# Patient Record
Sex: MALE | Race: WHITE | NOT HISPANIC OR LATINO | URBAN - METROPOLITAN AREA
[De-identification: names, ages, dates, MRNs, and addresses within clinical notes are randomized per-mention and may not be internally consistent; named-entity substitution may affect disease eponyms.]

---

## 2018-06-11 ENCOUNTER — OFFICE VISIT (OUTPATIENT)
Dept: GASTROENTEROLOGY | Facility: CLINIC | Age: 33
End: 2018-06-11
Payer: COMMERCIAL

## 2018-06-11 ENCOUNTER — LAB VISIT (OUTPATIENT)
Dept: LAB | Facility: HOSPITAL | Age: 33
End: 2018-06-11
Attending: NURSE PRACTITIONER
Payer: COMMERCIAL

## 2018-06-11 VITALS
DIASTOLIC BLOOD PRESSURE: 71 MMHG | HEART RATE: 64 BPM | WEIGHT: 254.13 LBS | BODY MASS INDEX: 38.51 KG/M2 | SYSTOLIC BLOOD PRESSURE: 119 MMHG | HEIGHT: 68 IN

## 2018-06-11 DIAGNOSIS — R10.12 LEFT UPPER QUADRANT PAIN: ICD-10-CM

## 2018-06-11 DIAGNOSIS — Z79.1 LONG TERM (CURRENT) USE OF NON-STEROIDAL ANTI-INFLAMMATORIES (NSAID): ICD-10-CM

## 2018-06-11 DIAGNOSIS — R12 HEARTBURN: ICD-10-CM

## 2018-06-11 DIAGNOSIS — R10.12 LEFT UPPER QUADRANT PAIN: Primary | ICD-10-CM

## 2018-06-11 LAB
ALBUMIN SERPL BCP-MCNC: 4.1 G/DL
ALP SERPL-CCNC: 61 U/L
ALT SERPL W/O P-5'-P-CCNC: 77 U/L
AMYLASE SERPL-CCNC: 71 U/L
ANION GAP SERPL CALC-SCNC: 7 MMOL/L
AST SERPL-CCNC: 37 U/L
BASOPHILS # BLD AUTO: 0.04 K/UL
BASOPHILS NFR BLD: 0.6 %
BILIRUB SERPL-MCNC: 0.5 MG/DL
BUN SERPL-MCNC: 17 MG/DL
CALCIUM SERPL-MCNC: 9.3 MG/DL
CHLORIDE SERPL-SCNC: 109 MMOL/L
CO2 SERPL-SCNC: 28 MMOL/L
CREAT SERPL-MCNC: 1.5 MG/DL
DIFFERENTIAL METHOD: ABNORMAL
EOSINOPHIL # BLD AUTO: 0 K/UL
EOSINOPHIL NFR BLD: 0.3 %
ERYTHROCYTE [DISTWIDTH] IN BLOOD BY AUTOMATED COUNT: 11.7 %
EST. GFR  (AFRICAN AMERICAN): >60 ML/MIN/1.73 M^2
EST. GFR  (NON AFRICAN AMERICAN): 59.9 ML/MIN/1.73 M^2
GLUCOSE SERPL-MCNC: 95 MG/DL
HCT VFR BLD AUTO: 42 %
HGB BLD-MCNC: 14.5 G/DL
IMM GRANULOCYTES # BLD AUTO: 0.01 K/UL
IMM GRANULOCYTES NFR BLD AUTO: 0.2 %
LIPASE SERPL-CCNC: 25 U/L
LYMPHOCYTES # BLD AUTO: 2 K/UL
LYMPHOCYTES NFR BLD: 32 %
MCH RBC QN AUTO: 33.2 PG
MCHC RBC AUTO-ENTMCNC: 34.5 G/DL
MCV RBC AUTO: 96 FL
MONOCYTES # BLD AUTO: 0.5 K/UL
MONOCYTES NFR BLD: 8.2 %
NEUTROPHILS # BLD AUTO: 3.7 K/UL
NEUTROPHILS NFR BLD: 58.7 %
NRBC BLD-RTO: 0 /100 WBC
PLATELET # BLD AUTO: 202 K/UL
PMV BLD AUTO: 11.3 FL
POTASSIUM SERPL-SCNC: 4.5 MMOL/L
PROT SERPL-MCNC: 6.8 G/DL
RBC # BLD AUTO: 4.37 M/UL
SODIUM SERPL-SCNC: 144 MMOL/L
WBC # BLD AUTO: 6.34 K/UL

## 2018-06-11 PROCEDURE — 36415 COLL VENOUS BLD VENIPUNCTURE: CPT | Mod: PO

## 2018-06-11 PROCEDURE — 83690 ASSAY OF LIPASE: CPT

## 2018-06-11 PROCEDURE — 3008F BODY MASS INDEX DOCD: CPT | Mod: CPTII,S$GLB,, | Performed by: NURSE PRACTITIONER

## 2018-06-11 PROCEDURE — 99203 OFFICE O/P NEW LOW 30 MIN: CPT | Mod: S$GLB,,, | Performed by: NURSE PRACTITIONER

## 2018-06-11 PROCEDURE — 82150 ASSAY OF AMYLASE: CPT

## 2018-06-11 PROCEDURE — 80053 COMPREHEN METABOLIC PANEL: CPT

## 2018-06-11 PROCEDURE — 99999 PR PBB SHADOW E&M-NEW PATIENT-LVL III: CPT | Mod: PBBFAC,,, | Performed by: NURSE PRACTITIONER

## 2018-06-11 PROCEDURE — 85025 COMPLETE CBC W/AUTO DIFF WBC: CPT

## 2018-06-11 RX ORDER — OMEPRAZOLE 40 MG/1
40 CAPSULE, DELAYED RELEASE ORAL
Qty: 30 CAPSULE | Refills: 3 | Status: ON HOLD | OUTPATIENT
Start: 2018-06-11 | End: 2018-06-14

## 2018-06-11 RX ORDER — NAPROXEN SODIUM 220 MG
220 TABLET ORAL 2 TIMES DAILY PRN
COMMUNITY

## 2018-06-11 NOTE — PATIENT INSTRUCTIONS
Abdominal Pain    Abdominal pain is pain in the stomach or belly area. Everyone has this pain from time to time. In many cases it goes away on its own. But abdominal pain can sometimes be due to a serious problem, such as appendicitis. So its important to know when to seek help.  Causes of abdominal pain  There are many possible causes of abdominal pain. Common causes in adults include:  · Constipation, diarrhea, or gas  · Stomach acid flowing back up into the esophagus (acid reflux or heartburn)  · Severe acid reflux, called GERD (gastroesophageal reflux disease)  · A sore in the lining of the stomach or small intestine (peptic ulcer)  · Inflammation of the gallbladder, liver, or pancreas  · Gallstones or kidney stones  · Appendicitis   · Intestinal blockage   · An internal organ pushing through a muscle or other tissue (hernia)  · Urinary tract infections  · In women, menstrual cramps, fibroids, or endometriosis  · Inflammation or infection of the intestines  Diagnosing the cause of abdominal pain  Your healthcare provider will do a physical exam help find the cause of your pain. If needed, tests will be ordered. Belly pain has many possible causes. So it can be hard to find the reason for your pain. Giving details about your pain can help. Tell your provider where and when you feel the pain, and what makes it better or worse. Also let your provider know if you have other symptoms such as:  · Fever  · Tiredness  · Upset stomach (nausea)  · Vomiting  · Changes in bathroom habits  Treating abdominal pain  Some causes of pain need emergency medical treatment right away. These include appendicitis or a bowel blockage. Other problems can be treated with rest, fluids, or medicines. Your healthcare provider can give you specific instructions for treatment or self-care based on what is causing your pain.  If you have vomiting or diarrhea, sip water or other clear fluids. When you are ready to eat solid foods again,  start with small amounts of easy-to-digest, low-fat foods. These include apple sauce, toast, or crackers.   When to seek medical care  Call 911 or go to the hospital right away if you:  · Cant pass stool and are vomiting  · Are vomiting blood or have bloody diarrhea or black, tarry diarrhea  · Have chest, neck, or shoulder pain  · Feel like you might pass out  · Have pain in your shoulder blades with nausea  · Have sudden, severe belly pain  · Have new, severe pain unlike any you have felt before  · Have a belly that is rigid, hard, and tender to touch  Call your healthcare provider if you have:  · Pain for more than 5 days  · Bloating for more than 2 days  · Diarrhea for more than 5 days  · A fever of 100.4°F (38.0°C) or higher, or as directed by your provider  · Pain that gets worse  · Weight loss for no reason  · Continued lack of appetite  · Blood in your stool  How to prevent abdominal pain  Here are some tips to help prevent abdominal pain:  · Eat smaller amounts of food at one time.  · Avoid greasy, fried, or other high-fat foods.  · Avoid foods that give you gas.  · Exercise regularly.  · Drink plenty of fluids.  To help prevent GERD symptoms:  · Quit smoking.  · Reduce alcohol and certain foods that increase stomach acid.  · Avoid aspirin and over-the-counter pain and fever medicines (NSAIDS or nonsteroidal anti-inflammatory drugs), if possible  · Lose extra weight.  · Finish eating at least 2 hours before you go to bed or lie down.  · Raise the head of your bed.  Date Last Reviewed: 7/1/2016  © 2293-2074 [employer]. [address] [place] [address], [place] [address]. All rights reserved. This information is not intended as a substitute for professional medical care. Always follow your healthcare professional's instructions.

## 2018-06-11 NOTE — PROGRESS NOTES
Subjective:       Patient ID: Jack Sanders is a 32 y.o. male Body mass index is 38.64 kg/m².    Chief Complaint: Abdominal Pain (left abdomen)    This patient is new to me.    Abdominal Pain   This is a chronic problem. The current episode started more than 1 year ago (started 4-5 years ago). The problem occurs intermittently (once every 2-3 weeks). The most recent episode lasted 2 days. The pain is located in the LUQ. The pain is at a severity of 0/10 (currently). The quality of the pain is burning. The abdominal pain radiates to the LLQ (rarely). Associated symptoms include belching and flatus. Pertinent negatives include no anorexia, constipation, diarrhea, dysuria, fever, frequency, hematochezia, melena, nausea, vomiting or weight loss. The pain is aggravated by NSAIDs (over eating, spicy foods, caffeine, hamburgers, aleve prn takes frequently). The pain is relieved by bowel movements, belching and passing flatus. He has tried nothing for the symptoms. His past medical history is significant for GERD (heartburn 1-2 times a week). There is no history of Crohn's disease, gallstones, pancreatitis or ulcerative colitis.     Review of Systems   Constitutional: Negative for appetite change, chills, fatigue, fever, unexpected weight change and weight loss.   HENT: Negative for sore throat and trouble swallowing.    Respiratory: Negative for cough, choking and shortness of breath.    Cardiovascular: Negative for chest pain.   Gastrointestinal: Positive for abdominal pain and flatus. Negative for anal bleeding, anorexia, blood in stool, constipation, diarrhea, hematochezia, melena, nausea, rectal pain and vomiting.   Genitourinary: Negative for difficulty urinating, dysuria, flank pain and frequency.   Neurological: Negative for weakness.       Past Medical History:   Diagnosis Date    Gout      History reviewed. No pertinent surgical history.  Family History   Problem Relation Age of Onset    Colon cancer Neg Hx      Colon polyps Neg Hx     Crohn's disease Neg Hx     Esophageal cancer Neg Hx     Rectal cancer Neg Hx     Ulcerative colitis Neg Hx     Stomach cancer Neg Hx      Wt Readings from Last 10 Encounters:   06/11/18 115.3 kg (254 lb 1.6 oz)     Objective:      Physical Exam   Constitutional: He is oriented to person, place, and time. He appears well-developed and well-nourished. No distress.   HENT:   Mouth/Throat: Oropharynx is clear and moist and mucous membranes are normal. No oral lesions. No oropharyngeal exudate.   Eyes: Conjunctivae are normal. Pupils are equal, round, and reactive to light. No scleral icterus.   Cardiovascular: Normal rate.    Pulmonary/Chest: Effort normal and breath sounds normal. No respiratory distress. He has no wheezes.   Abdominal: Soft. Normal appearance and bowel sounds are normal. He exhibits no distension, no abdominal bruit and no mass. There is no hepatosplenomegaly. There is tenderness (mild) in the left upper quadrant and left lower quadrant. There is no rigidity, no rebound, no guarding, no tenderness at McBurney's point and negative Tian's sign. No hernia.   Neurological: He is alert and oriented to person, place, and time.   Skin: Skin is warm and dry. No rash noted. He is not diaphoretic. No erythema. No pallor.   Non-jaundiced   Psychiatric: He has a normal mood and affect. His behavior is normal. Judgment and thought content normal.   Nursing note and vitals reviewed.      Assessment:       1. Left upper quadrant pain    2. Heartburn    3. Long term (current) use of non-steroidal anti-inflammatories (nsaid)        Plan:       Left upper quadrant pain  -     CBC auto differential; Future; Expected date: 06/11/2018  -     Comprehensive metabolic panel; Future; Expected date: 06/11/2018  -     Lipase; Future; Expected date: 06/11/2018  -     Amylase; Future; Expected date: 06/11/2018  -     US Abdomen Complete; Future; Expected date: 06/11/2018  -     START omeprazole  (PRILOSEC) 40 MG capsule; Take 1 capsule (40 mg total) by mouth before breakfast.  Dispense: 30 capsule; Refill: 3  - schedule EGD, discussed procedure with patient, patient verbalized understanding  - Possible CT SCAN pending results of testing and if symptoms persist    Heartburn  -     START omeprazole (PRILOSEC) 40 MG capsule; Take 1 capsule (40 mg total) by mouth before breakfast.  Dispense: 30 capsule; Refill: 3, - take in the morning 30-60 minutes before breakfast, discussed about possible long term use of medication (prefer to use lowest effective dose or discontinuing if possible), pt verbalized understanding  -discussed about the different types of medications used to treat reflux and how to use them, antacids can be used PRN for breakthrough heartburn symptoms by reducing stomach acid that is already produced, H2 blockers (zantac) work by limiting the amount acid production, & PPI's work to block acid production and are taken daily, patient verbalized understanding.  -Educated patient on lifestyle modifications to help control/reduce reflux/abdominal pain including: avoid large meals, avoid eating within 2-3 hours of bedtime (avoid late night eating & lying down soon after eating), elevate head of bed if nocturnal symptoms are present, smoking cessation (if current smoker), & weight loss (if overweight).   -Educated to avoid known foods which trigger reflux symptoms & to minimize/avoid high-fat foods, chocolate, caffeine, citrus, alcohol, & tomato products.  -Advised to avoid/limit use of NSAID's, since they can cause GI upset, bleeding, and/or ulcers. If needed, take with food.   - schedule EGD, discussed procedure with patient, patient verbalized understanding    Long term (current) use of non-steroidal anti-inflammatories (nsaid)  - avoid/minimize use of NSAIDs- since they can cause GI upset, bleeding and/or ulcers. If NSAID must be taken, recommend take with food.    Follow-up in about 1 month (around  7/11/2018), or if symptoms worsen or fail to improve.      If no improvement in symptoms or symptoms worsen, call/follow-up at clinic or go to ER.

## 2018-06-13 NOTE — H&P
History & Physical - Short Stay  Gastroenterology      SUBJECTIVE:     Procedure: Gastroscopy    Chief Complaint/Indication for Procedure: Abdo pain, LUQ.    History of Present Illness:  Office Visit  Open      6/11/2018  [facility] - Gastroenterology   RICHIE Driver   Gastroenterology   Left upper quadrant pain +2 more   Dx   Abdominal Pain ; Referred by Aaareferral Self   Reason for Visit    Progress Notes   Unsigned      Subjective:       Patient ID: Jack Sanders is a 32 y.o. male Body mass index is 38.64 kg/m².     Chief Complaint: Abdominal Pain (left abdomen)     This patient is new to me.     Abdominal Pain   This is a chronic problem. The current episode started more than 1 year ago (started 4-5 years ago). The problem occurs intermittently (once every 2-3 weeks). The most recent episode lasted 2 days. The pain is located in the LUQ. The pain is at a severity of 0/10 (currently). The quality of the pain is burning. The abdominal pain radiates to the LLQ (rarely). Associated symptoms include belching and flatus. Pertinent negatives include no anorexia, constipation, diarrhea, dysuria, fever, frequency, hematochezia, melena, nausea, vomiting or weight loss. The pain is aggravated by NSAIDs (over eating, spicy foods, caffeine, hamburgers, aleve prn takes frequently). The pain is relieved by bowel movements, belching and passing flatus. He has tried nothing for the symptoms. His past medical history is significant for GERD (heartburn 1-2 times a week). There is no history of Crohn's disease, gallstones, pancreatitis or ulcerative colitis.     Assessment:       1. Abdominal pain, unspecified abdominal location        Plan:       Abdominal pain, unspecified abdominal location        Follow-up in about 1 month (around 7/11/2018), or if symptoms worsen or fail to improve.      If no improvement in symptoms or symptoms worsen, call/follow-up at clinic or go to ER.              PTA Medications   Medication  "Sig    naproxen sodium (ANAPROX) 220 MG tablet Take 220 mg by mouth 2 (two) times daily as needed.    omeprazole (PRILOSEC) 40 MG capsule Take 1 capsule (40 mg total) by mouth before breakfast.       Review of patient's allergies indicates:  No Known Allergies     Past Medical History:   Diagnosis Date    Gout      History reviewed. No pertinent surgical history.  Family History   Problem Relation Age of Onset    Colon cancer Neg Hx     Colon polyps Neg Hx     Crohn's disease Neg Hx     Esophageal cancer Neg Hx     Rectal cancer Neg Hx     Ulcerative colitis Neg Hx     Stomach cancer Neg Hx      Social History   Substance Use Topics    Smoking status: Never Smoker    Smokeless tobacco: Never Used    Alcohol use No         OBJECTIVE:     Vital Signs (Most Recent)  Temp: 97.9 °F (36.6 °C) (06/14/18 1139)  Pulse: (!) 55 (06/14/18 1139)  Resp: 16 (06/14/18 1139)  BP: 107/66 (06/14/18 1139)  SpO2: 97 % (06/14/18 1139)    Physical Exam:          : Ht 5' 8" (1.727 m)   Wt 115.3 kg (254 lb 1.6 oz)   BMI 38.64 kg/m²                                              GENERAL:  Comfortable, in no acute distress.                                 HEENT EXAM:  Nonicteric.  No adenopathy.  Oropharynx is clear.               NECK:  Supple.                                                               LUNGS:  Clear.                                                               CARDIAC:  Regular rate and rhythm.  S1, S2.  No murmur.                      ABDOMEN:  Soft, positive bowel sounds, nontender.  No hepatosplenomegaly or masses.  No rebound or guarding.                                             EXTREMITIES:  No edema.     MENTAL STATUS:  Alert and oriented.    ASSESSMENT/PLAN:     Assessment: Abdo pain, LUQ.    Plan: Gastroscopy    Anesthesia Plan:   MAC / General Anaesthesia    ASA Grade: ASA 2 - Patient with mild systemic disease with no functional limitations    MALLAMPATI SCORE: II (hard and soft palate, upper " portion of tonsils anduvula visible)

## 2018-06-14 ENCOUNTER — ANESTHESIA EVENT (OUTPATIENT)
Dept: ENDOSCOPY | Facility: HOSPITAL | Age: 33
End: 2018-06-14
Payer: COMMERCIAL

## 2018-06-14 ENCOUNTER — SURGERY (OUTPATIENT)
Age: 33
End: 2018-06-14

## 2018-06-14 ENCOUNTER — PATIENT MESSAGE (OUTPATIENT)
Dept: GASTROENTEROLOGY | Facility: CLINIC | Age: 33
End: 2018-06-14

## 2018-06-14 ENCOUNTER — TELEPHONE (OUTPATIENT)
Dept: GASTROENTEROLOGY | Facility: CLINIC | Age: 33
End: 2018-06-14

## 2018-06-14 ENCOUNTER — ANESTHESIA (OUTPATIENT)
Dept: ENDOSCOPY | Facility: HOSPITAL | Age: 33
End: 2018-06-14
Payer: COMMERCIAL

## 2018-06-14 ENCOUNTER — HOSPITAL ENCOUNTER (OUTPATIENT)
Facility: HOSPITAL | Age: 33
Discharge: HOME | End: 2018-06-14
Attending: INTERNAL MEDICINE | Admitting: INTERNAL MEDICINE
Payer: COMMERCIAL

## 2018-06-14 ENCOUNTER — DOCUMENTATION ONLY (OUTPATIENT)
Dept: TRANSPLANT | Facility: CLINIC | Age: 33
End: 2018-06-14

## 2018-06-14 ENCOUNTER — HOSPITAL ENCOUNTER (OUTPATIENT)
Dept: RADIOLOGY | Facility: HOSPITAL | Age: 33
Discharge: HOME | End: 2018-06-14
Attending: NURSE PRACTITIONER | Admitting: INTERNAL MEDICINE
Payer: COMMERCIAL

## 2018-06-14 VITALS
HEART RATE: 76 BPM | RESPIRATION RATE: 16 BRPM | SYSTOLIC BLOOD PRESSURE: 120 MMHG | DIASTOLIC BLOOD PRESSURE: 60 MMHG | TEMPERATURE: 98 F | HEIGHT: 68 IN | OXYGEN SATURATION: 98 % | WEIGHT: 255 LBS | BODY MASS INDEX: 38.65 KG/M2

## 2018-06-14 DIAGNOSIS — K76.0 FATTY LIVER: ICD-10-CM

## 2018-06-14 DIAGNOSIS — R74.01 ELEVATED ALT MEASUREMENT: Primary | ICD-10-CM

## 2018-06-14 DIAGNOSIS — R12 HEARTBURN: ICD-10-CM

## 2018-06-14 DIAGNOSIS — R10.9 ABDOMINAL PAIN: ICD-10-CM

## 2018-06-14 DIAGNOSIS — R10.12 LEFT UPPER QUADRANT PAIN: ICD-10-CM

## 2018-06-14 DIAGNOSIS — R16.2 HEPATOSPLENOMEGALY: ICD-10-CM

## 2018-06-14 LAB — H PYLORI INDEX VALUE: NEGATIVE

## 2018-06-14 PROCEDURE — D9220A PRA ANESTHESIA: Mod: CRNA,,, | Performed by: NURSE ANESTHETIST, CERTIFIED REGISTERED

## 2018-06-14 PROCEDURE — 88342 IMHCHEM/IMCYTCHM 1ST ANTB: CPT | Mod: 26,,, | Performed by: PATHOLOGY

## 2018-06-14 PROCEDURE — 88305 TISSUE EXAM BY PATHOLOGIST: CPT | Performed by: PATHOLOGY

## 2018-06-14 PROCEDURE — 25000003 PHARM REV CODE 250: Mod: PO | Performed by: INTERNAL MEDICINE

## 2018-06-14 PROCEDURE — 25000003 PHARM REV CODE 250: Mod: PO | Performed by: NURSE ANESTHETIST, CERTIFIED REGISTERED

## 2018-06-14 PROCEDURE — 87449 NOS EACH ORGANISM AG IA: CPT | Mod: PO | Performed by: INTERNAL MEDICINE

## 2018-06-14 PROCEDURE — 37000009 HC ANESTHESIA EA ADD 15 MINS: Mod: PO | Performed by: INTERNAL MEDICINE

## 2018-06-14 PROCEDURE — 63600175 PHARM REV CODE 636 W HCPCS: Mod: PO | Performed by: NURSE ANESTHETIST, CERTIFIED REGISTERED

## 2018-06-14 PROCEDURE — 37000008 HC ANESTHESIA 1ST 15 MINUTES: Mod: PO | Performed by: INTERNAL MEDICINE

## 2018-06-14 PROCEDURE — 43239 EGD BIOPSY SINGLE/MULTIPLE: CPT | Mod: ,,, | Performed by: INTERNAL MEDICINE

## 2018-06-14 PROCEDURE — 43239 EGD BIOPSY SINGLE/MULTIPLE: CPT | Mod: PO | Performed by: INTERNAL MEDICINE

## 2018-06-14 PROCEDURE — 76700 US EXAM ABDOM COMPLETE: CPT | Mod: TC,PO

## 2018-06-14 PROCEDURE — 76700 US EXAM ABDOM COMPLETE: CPT | Mod: 26,,, | Performed by: RADIOLOGY

## 2018-06-14 PROCEDURE — 27201012 HC FORCEPS, HOT/COLD, DISP: Mod: PO | Performed by: INTERNAL MEDICINE

## 2018-06-14 PROCEDURE — D9220A PRA ANESTHESIA: Mod: ANES,,, | Performed by: ANESTHESIOLOGY

## 2018-06-14 RX ORDER — SODIUM CHLORIDE, SODIUM LACTATE, POTASSIUM CHLORIDE, CALCIUM CHLORIDE 600; 310; 30; 20 MG/100ML; MG/100ML; MG/100ML; MG/100ML
INJECTION, SOLUTION INTRAVENOUS CONTINUOUS
Status: DISCONTINUED | OUTPATIENT
Start: 2018-06-14 | End: 2018-06-14 | Stop reason: HOSPADM

## 2018-06-14 RX ORDER — GLYCOPYRROLATE 0.2 MG/ML
INJECTION INTRAMUSCULAR; INTRAVENOUS
Status: DISCONTINUED | OUTPATIENT
Start: 2018-06-14 | End: 2018-06-14

## 2018-06-14 RX ORDER — PROPOFOL 10 MG/ML
VIAL (ML) INTRAVENOUS
Status: DISCONTINUED | OUTPATIENT
Start: 2018-06-14 | End: 2018-06-14

## 2018-06-14 RX ORDER — OMEPRAZOLE 40 MG/1
40 CAPSULE, DELAYED RELEASE ORAL
Qty: 90 CAPSULE | Refills: 3 | Status: SHIPPED | OUTPATIENT
Start: 2018-06-14 | End: 2019-06-14

## 2018-06-14 RX ORDER — LIDOCAINE HCL/PF 100 MG/5ML
SYRINGE (ML) INTRAVENOUS
Status: DISCONTINUED | OUTPATIENT
Start: 2018-06-14 | End: 2018-06-14

## 2018-06-14 RX ADMIN — PROPOFOL 50 MG: 10 INJECTION, EMULSION INTRAVENOUS at 01:06

## 2018-06-14 RX ADMIN — PROPOFOL 25 MG: 10 INJECTION, EMULSION INTRAVENOUS at 01:06

## 2018-06-14 RX ADMIN — SODIUM CHLORIDE, SODIUM LACTATE, POTASSIUM CHLORIDE, AND CALCIUM CHLORIDE: .6; .31; .03; .02 INJECTION, SOLUTION INTRAVENOUS at 11:06

## 2018-06-14 RX ADMIN — GLYCOPYRROLATE 0.2 MG: 0.2 INJECTION, SOLUTION INTRAMUSCULAR; INTRAVENOUS at 12:06

## 2018-06-14 RX ADMIN — PROPOFOL 150 MG: 10 INJECTION, EMULSION INTRAVENOUS at 01:06

## 2018-06-14 RX ADMIN — LIDOCAINE HYDROCHLORIDE 100 MG: 20 INJECTION, SOLUTION INTRAVENOUS at 01:06

## 2018-06-14 NOTE — NURSING
Pt records reviewed.   Pt will be referred to Hepatology.    Initial referral received  from the [facility]que.   Referring Provider/diagnosis  MARY THAPA Provider:   Diagnosis: Elevated ALT measurement  Hepatosplenomegaly  Fatty liver       Referral letter sent to provider and patient.

## 2018-06-14 NOTE — LETTER
June 14, 2018    Jack Sanders  [id number] [facility] [address]      Dear Jack Sanders:    Your doctor has referred you to the [facility]. You will be contacted by our office and an initial appointment will then be scheduled for you.    We look forward to seeing you soon. If you have any further questions, please contact us at [phone].       Sincerely,        [facility]   [address] [place] [id number]  [phone]

## 2018-06-14 NOTE — ANESTHESIA POSTPROCEDURE EVALUATION
"Anesthesia Post Evaluation    Patient: Jack Sanders    Procedure(s) Performed: Procedure(s) (LRB):  EGD (ESOPHAGOGASTRODUODENOSCOPY) (N/A)    Final Anesthesia Type: general  Patient location during evaluation: PACU  Patient participation: Yes- Able to Participate  Level of consciousness: awake and alert and oriented  Post-procedure vital signs: reviewed and stable  Pain management: adequate  Airway patency: patent  PONV status at discharge: No PONV  Anesthetic complications: no      Cardiovascular status: hemodynamically stable and blood pressure returned to baseline  Respiratory status: unassisted, spontaneous ventilation and room air  Hydration status: euvolemic  Follow-up not needed.        Visit Vitals  /60 (BP Location: Left arm, Patient Position: Lying)   Pulse 76   Temp 36.6 °C (97.8 °F) (Skin)   Resp 16   Ht 5' 8" (1.727 m)   Wt 115.7 kg (255 lb)   SpO2 98%   BMI 38.77 kg/m²       Pain/Mookie Score: Pain Assessment Performed: Yes (6/14/2018  1:53 PM)  Presence of Pain: denies (6/14/2018  1:53 PM)  Mookie Score: 10 (6/14/2018  1:53 PM)      "

## 2018-06-14 NOTE — [employer] PATIENT INSTRUCTIONS
Discharge Summary/Instructions after an Endoscopic Procedure  Patient Name: Jack Sanders  Patient MRN: [id number]  Patient YOB: 1985 Thursday, June 14, 2018  Brian Encinas MD  RESTRICTIONS:  During your procedure today, you received medications for sedation.  These   medications may affect your judgment, balance and coordination.  Therefore,   for 24 hours, you have the following restrictions:   - DO NOT drive a car, operate machinery, make legal/financial decisions,   sign important papers or drink alcohol.    ACTIVITY:  Today: no heavy lifting, straining or running due to procedural   sedation/anesthesia.  The following day: return to full activity including work.  DIET:  Eat and drink normally unless instructed otherwise.     TREATMENT FOR COMMON SIDE EFFECTS:  - Mild abdominal pain, nausea, belching, bloating or excessive gas:  rest,   eat lightly and use a heating pad.  - Sore Throat: treat with throat lozenges and/or gargle with warm salt   water.  - Because air was used during the procedure, expelling large amounts of air   from your rectum or belching is normal.  - If a bowel prep was taken, you may not have a bowel movement for 1-3 days.    This is normal.  SYMPTOMS TO WATCH FOR AND REPORT TO YOUR PHYSICIAN:  1. Abdominal pain or bloating, other than gas cramps.  2. Chest pain.  3. Back pain.  4. Signs of infection such as: chills or fever occurring within 24 hours   after the procedure.  5. Rectal bleeding, which would show as bright red, maroon, or black stools.   (A tablespoon of blood from the rectum is not serious, especially if   hemorrhoids are present.)  6. Vomiting.  7. Weakness or dizziness.  GO DIRECTLY TO THE NEAREST EMERGENCY ROOM IF YOU HAVE ANY OF THE FOLLOWING:      Difficulty breathing              Chills and/or fever over 101 F   Persistent vomiting and/or vomiting blood   Severe abdominal pain   Severe chest pain   Black, tarry stools   Bleeding- more than one  tablespoon   Any other symptom or condition that you feel may need urgent attention  Your doctor recommends these additional instructions:  If any biopsies were taken, your doctors clinic will contact you in 1 to 2   weeks with any results.  - Discharge patient to home.   - Await pathology and CLOtest results.   - Follow an antireflux regimen.   - Continue present medications.   - Use Prilosec (omeprazole) 40 mg PO daily.   - Add Zantac (ranitidine) 300 mg PO daily for 2-3 months.   - Return to GI clinic in 6-8 weeks.  For questions, problems or results please call your physician - Brian Encinas MD at Work:  [phone].  EMERGENCY PHONE NUMBER: [phone], LAB RESULTS: [phone]  IF A COMPLICATION OR EMERGENCY SITUATION ARISES AND YOU ARE UNABLE TO REACH   YOUR PHYSICIAN - GO DIRECTLY TO THE EMERGENCY ROOM.  ___________________________________________  Nurse Signature  ___________________________________________  Patient/Designated Responsible Party Signature  Brian Encinas MD  6/14/2018 1:34:47 PM  This report has been verified and signed electronically.  [employer]

## 2018-06-14 NOTE — BRIEF OP NOTE
Discharge Note  Short Stay      SUMMARY     Admit Date: 6/14/2018    Attending Physician: Brian Encinas Jr., MD     Discharge Physician: Brian Encinas Jr., MD    Discharge Date: 6/14/2018 1:37 PM    Final Diagnosis: LUQ pain [R10.12]  Heartburn [R12]  Impression:          - Normal oropharynx.                       - Normal proximal esophagus and mid esophagus.                       - LA Grade A reflux esophagitis.                       - Z-line regular, 38 cm from the incisors.                       - Patulous lower esophageal sphincter.                       - Gastritis. Biopsied.                       - Normal stomach otherwise. Biopsied.                       - Normal pylorus.                       - Erythematous duodenopathy.                       - Normal examined duodenum. Biopsied.  Recommendation:      - Discharge patient to home.                       - Await pathology and CLOtest results.                       - Follow an antireflux regimen.                       - Continue present medications.                       - Use Prilosec (omeprazole) 40 mg PO daily.                       - Add Zantac (ranitidine) 300 mg PO daily for 2-3                        months.                       - Return to GI clinic in 6-8 weeks.  Brian Encinas MD  6/14/2018   Disposition: HOME OR SELF CARE    Patient Instructions:   Current Discharge Medication List      START taking these medications    Details   ranitidine (ZANTAC) 300 MG tablet Take 1 tablet (300 mg total) by mouth every evening. , about 30-60 minutes before bedtime.  Qty: 90 tablet, Refills: 3         CONTINUE these medications which have NOT CHANGED    Details   naproxen sodium (ANAPROX) 220 MG tablet Take 220 mg by mouth 2 (two) times daily as needed.      omeprazole (PRILOSEC) 40 MG capsule Take 1 capsule (40 mg total) by mouth before breakfast.  Qty: 30 capsule, Refills: 3    Associated Diagnoses: Left upper quadrant pain; Heartburn              Discharge Procedure Orders (must include Diet, Follow-up, Activity)    Follow Up:  Follow up with PCP as per your routine.  Please follow an anti reflux diet.  Activity as tolerated.    No driving day of procedure.

## 2018-06-14 NOTE — TELEPHONE ENCOUNTER
Please call to inform & review the results with the patient- radiology report of the abdominal ultrasound showed enlarged fatty liver and mild splenomegaly. Otherwise unremarkable findings on ultrasound. Blood work showed normal pancreatic enzymes, unremarkable electrolytes, mild elevation in creatinine level (kidney function level)- Recommend to stay well-hydrated and follow-up with Primary Care Provider for continued evaluation and management of this finding; liver function levels showed mild elevation in ALT, otherwise unremarkable; and blood counts showed mild decrease in RBC otherwise no signs of anemia.    For fatty liver recommend: low fat, low cholesterol diet, maintain good control of blood sugars and cholesterol levels, exercise, weight loss, minimize/avoid alcohol and tylenol products, & recommend seeing hepatology for continued evaluation and management of these liver findings.    Continue with previous recommendations. If no improvement in symptoms or symptoms worsen, call/follow-up at clinic or go to ER.  Please release results to patient's mychart once you have discussed results and recommendations with patient.  Thanks,  Rhonda GUAN

## 2018-06-14 NOTE — TRANSFER OF CARE
"Anesthesia Transfer of Care Note    Patient: Jack Sanders    Procedure(s) Performed: Procedure(s) (LRB):  EGD (ESOPHAGOGASTRODUODENOSCOPY) (N/A)    Patient location: PACU    Anesthesia Type: general    Transport from OR: Transported from OR on 2-3 L/min O2 by NC with adequate spontaneous ventilation    Post pain: adequate analgesia    Post assessment: no apparent anesthetic complications    Post vital signs: stable    Level of consciousness: awake, alert and oriented    Nausea/Vomiting: no nausea/vomiting    Complications: none    Transfer of care protocol was followed      Last vitals:   Visit Vitals  /60 (BP Location: Left arm, Patient Position: Lying)   Pulse 80   Temp 36.6 °C (97.8 °F) (Skin)   Resp 16   Ht 5' 8" (1.727 m)   Wt 115.7 kg (255 lb)   SpO2 99%   BMI 38.77 kg/m²     "

## 2018-06-14 NOTE — DISCHARGE INSTRUCTIONS
Recovery After Procedural Sedation (Adult)  You have been given medicine by vein to make you sleep during your surgery. This may have included both a pain medicine and sleeping medicine. Most of the effects have worn off. But you may still have some drowsiness for the next 6 to 8 hours.  Home care  Follow these guidelines when you get home:  · For the next 8 hours, you should be watched by a responsible adult. This person should make sure your condition is not getting worse.  · Don't drink any alcohol for the next 24 hours.  · Don't drive, operate dangerous machinery, or make important business or personal decisions during the next 24 hours.  Note: Your healthcare provider may tell you not to take any medicine by mouth for pain or sleep in the next 4 hours. These medicines may react with the medicines you were given in the hospital. This could cause a much stronger response than usual.  Follow-up care  Follow up with your healthcare provider if you are not alert and back to your usual level of activity within 12 hours.  When to seek medical advice  Call your healthcare provider right away if any of these occur:  · Drowsiness gets worse  · Weakness or dizziness gets worse  · Repeated vomiting  · You can't be awakened   Date Last Reviewed: 10/18/2016  © 9019-4850 [employer] [address] [place] [address], [place] [address]. All rights reserved. This information is not intended as a substitute for professional medical care. Always follow your healthcare professional's instructions.        Tips to Control Acid Reflux    To control acid reflux, youll need to make some basic diet and lifestyle changes. The simple steps outlined below may be all youll need to ease discomfort.  Watch what you eat  · Avoid fatty foods and spicy foods.  · Eat fewer acidic foods, such as citrus and tomato-based foods. These can increase symptoms.  · Limit drinking alcohol, caffeine, and fizzy beverages. All increase acid  reflux.  · Try limiting chocolate, peppermint, and spearmint. These can worsen acid reflux in some people.  Watch when you eat  · Avoid lying down for 3 hours after eating.  · Do not snack before going to bed.  Raise your head  Raising your head and upper body by 4 to 6 inches helps limit reflux when youre lying down. Put blocks under the head of your bed frame to raise it.  Other changes  · Lose weight, if you need to  · Dont exercise near bedtime  · Avoid tight-fitting clothes  · Limit aspirin and ibuprofen  · Stop smoking   Date Last Reviewed: 7/1/2016  © 5685-7239 [employer]. [address] [place] [address], [place] [address]. All rights reserved. This information is not intended as a substitute for professional medical care. Always follow your healthcare professional's instructions.

## 2018-06-14 NOTE — ANESTHESIA PREPROCEDURE EVALUATION
06/14/2018  Jack Sanders is a 32 y.o., male.    Anesthesia Evaluation      I have reviewed the Medications.     Review of Systems  Anesthesia Hx:  No problems with previous Anesthesia   Social:  Non-Smoker, No Alcohol Use    Cardiovascular:  Cardiovascular Normal     Pulmonary:  Pulmonary Normal    Renal/:  Renal/ Normal     Hepatic/GI:   GERD    Musculoskeletal:   Arthritis (gout)     Neurological:  Neurology Normal    Endocrine:  Endocrine Normal        Physical Exam  General:  Obesity    Airway/Jaw/Neck:  Airway Findings: Mouth Opening: Normal Tongue: Normal  General Airway Assessment: Adult, Average  Mallampati: II  Jaw/Neck Findings:  Neck ROM: Normal ROM       Chest/Lungs:  Chest/Lungs Findings: Clear to auscultation, Normal Respiratory Rate     Heart/Vascular:  Heart Findings: Rate: Normal  Rhythm: Regular Rhythm  Sounds: Normal  Heart murmur: negative       Mental Status:  Mental Status Findings:  Cooperative, Alert and Oriented         Anesthesia Plan  Type of Anesthesia, risks & benefits discussed:  Anesthesia Type:  general  Patient's Preference:   Intra-op Monitoring Plan:   Intra-op Monitoring Plan Comments:   Post Op Pain Control Plan:   Post Op Pain Control Plan Comments:   Induction:   IV  Beta Blocker:  Patient is not currently on a Beta-Blocker (No further documentation required).       Informed Consent: Patient understands risks and agrees with Anesthesia plan.  Questions answered. Anesthesia consent signed with patient.  ASA Score: 2     Day of Surgery Review of History & Physical:        Anesthesia Plan Notes: Propofol general.        Ready For Surgery From Anesthesia Perspective.

## 2018-07-26 ENCOUNTER — PROCEDURE VISIT (OUTPATIENT)
Dept: HEPATOLOGY | Facility: CLINIC | Age: 33
End: 2018-07-26
Attending: INTERNAL MEDICINE
Payer: COMMERCIAL

## 2018-07-26 ENCOUNTER — OFFICE VISIT (OUTPATIENT)
Dept: HEPATOLOGY | Facility: CLINIC | Age: 33
End: 2018-07-26
Payer: COMMERCIAL

## 2018-07-26 ENCOUNTER — LAB VISIT (OUTPATIENT)
Dept: LAB | Facility: HOSPITAL | Age: 33
End: 2018-07-26
Attending: INTERNAL MEDICINE
Payer: COMMERCIAL

## 2018-07-26 ENCOUNTER — OFFICE VISIT (OUTPATIENT)
Dept: GASTROENTEROLOGY | Facility: CLINIC | Age: 33
End: 2018-07-26
Payer: COMMERCIAL

## 2018-07-26 VITALS
HEIGHT: 68 IN | WEIGHT: 229.25 LBS | SYSTOLIC BLOOD PRESSURE: 127 MMHG | TEMPERATURE: 96 F | DIASTOLIC BLOOD PRESSURE: 65 MMHG | RESPIRATION RATE: 18 BRPM | BODY MASS INDEX: 34.75 KG/M2 | OXYGEN SATURATION: 94 % | HEART RATE: 50 BPM

## 2018-07-26 VITALS — BODY MASS INDEX: 34.78 KG/M2 | WEIGHT: 229.5 LBS | HEIGHT: 68 IN

## 2018-07-26 DIAGNOSIS — R74.8 ELEVATED LIVER ENZYMES: ICD-10-CM

## 2018-07-26 DIAGNOSIS — K21.00 GASTROESOPHAGEAL REFLUX DISEASE WITH ESOPHAGITIS: ICD-10-CM

## 2018-07-26 DIAGNOSIS — Z98.890 HISTORY OF ESOPHAGOGASTRODUODENOSCOPY (EGD): Primary | ICD-10-CM

## 2018-07-26 DIAGNOSIS — R10.12 LEFT UPPER QUADRANT PAIN: ICD-10-CM

## 2018-07-26 DIAGNOSIS — R74.01 ELEVATED ALT MEASUREMENT: ICD-10-CM

## 2018-07-26 DIAGNOSIS — K29.50 OTHER CHRONIC GASTRITIS WITHOUT HEMORRHAGE: ICD-10-CM

## 2018-07-26 DIAGNOSIS — R16.2 HEPATOSPLENOMEGALY: ICD-10-CM

## 2018-07-26 DIAGNOSIS — K76.0 FATTY LIVER DISEASE, NONALCOHOLIC: Primary | ICD-10-CM

## 2018-07-26 DIAGNOSIS — K76.0 FATTY LIVER DISEASE, NONALCOHOLIC: ICD-10-CM

## 2018-07-26 LAB
A1AT SERPL-MCNC: 152 MG/DL
ALBUMIN SERPL BCP-MCNC: 4.4 G/DL
ALP SERPL-CCNC: 64 U/L
ALT SERPL W/O P-5'-P-CCNC: 64 U/L
AST SERPL-CCNC: 56 U/L
BILIRUB DIRECT SERPL-MCNC: 0.5 MG/DL
BILIRUB SERPL-MCNC: 0.9 MG/DL
CERULOPLASMIN SERPL-MCNC: 26 MG/DL
CHOLEST SERPL-MCNC: 76 MG/DL
CHOLEST/HDLC SERPL: 2.4 {RATIO}
ESTIMATED AVG GLUCOSE: 97 MG/DL
FERRITIN SERPL-MCNC: 46 NG/ML
HBA1C MFR BLD HPLC: 5 %
HDLC SERPL-MCNC: 32 MG/DL
HDLC SERPL: 42.1 %
IGA SERPL-MCNC: 104 MG/DL
IGG SERPL-MCNC: 771 MG/DL
IGM SERPL-MCNC: 31 MG/DL
IRON SERPL-MCNC: 83 UG/DL
LDLC SERPL CALC-MCNC: 36.2 MG/DL
NONHDLC SERPL-MCNC: 44 MG/DL
PROT SERPL-MCNC: 7 G/DL
SATURATED IRON: 22 %
TOTAL IRON BINDING CAPACITY: 382 UG/DL
TRANSFERRIN SERPL-MCNC: 258 MG/DL
TRIGL SERPL-MCNC: 39 MG/DL

## 2018-07-26 PROCEDURE — 91200 LIVER ELASTOGRAPHY: CPT | Mod: S$GLB,,, | Performed by: INTERNAL MEDICINE

## 2018-07-26 PROCEDURE — 99999 PR PBB SHADOW E&M-EST. PATIENT-LVL IV: CPT | Mod: PBBFAC,,, | Performed by: INTERNAL MEDICINE

## 2018-07-26 PROCEDURE — 80061 LIPID PANEL: CPT

## 2018-07-26 PROCEDURE — 3008F BODY MASS INDEX DOCD: CPT | Mod: CPTII,S$GLB,, | Performed by: NURSE PRACTITIONER

## 2018-07-26 PROCEDURE — 99214 OFFICE O/P EST MOD 30 MIN: CPT | Mod: S$GLB,,, | Performed by: NURSE PRACTITIONER

## 2018-07-26 PROCEDURE — 86803 HEPATITIS C AB TEST: CPT

## 2018-07-26 PROCEDURE — 3008F BODY MASS INDEX DOCD: CPT | Mod: CPTII,S$GLB,, | Performed by: INTERNAL MEDICINE

## 2018-07-26 PROCEDURE — 83036 HEMOGLOBIN GLYCOSYLATED A1C: CPT

## 2018-07-26 PROCEDURE — 82728 ASSAY OF FERRITIN: CPT

## 2018-07-26 PROCEDURE — 83540 ASSAY OF IRON: CPT

## 2018-07-26 PROCEDURE — 99999 PR PBB SHADOW E&M-EST. PATIENT-LVL III: CPT | Mod: PBBFAC,,, | Performed by: NURSE PRACTITIONER

## 2018-07-26 PROCEDURE — 82103 ALPHA-1-ANTITRYPSIN TOTAL: CPT

## 2018-07-26 PROCEDURE — 80076 HEPATIC FUNCTION PANEL: CPT

## 2018-07-26 PROCEDURE — 86235 NUCLEAR ANTIGEN ANTIBODY: CPT

## 2018-07-26 PROCEDURE — 99204 OFFICE O/P NEW MOD 45 MIN: CPT | Mod: S$GLB,,, | Performed by: INTERNAL MEDICINE

## 2018-07-26 PROCEDURE — 82390 ASSAY OF CERULOPLASMIN: CPT

## 2018-07-26 PROCEDURE — 36415 COLL VENOUS BLD VENIPUNCTURE: CPT

## 2018-07-26 PROCEDURE — 86256 FLUORESCENT ANTIBODY TITER: CPT | Mod: 91

## 2018-07-26 PROCEDURE — 86706 HEP B SURFACE ANTIBODY: CPT

## 2018-07-26 PROCEDURE — 87340 HEPATITIS B SURFACE AG IA: CPT

## 2018-07-26 PROCEDURE — 82784 ASSAY IGA/IGD/IGG/IGM EACH: CPT | Mod: 59

## 2018-07-26 NOTE — PATIENT INSTRUCTIONS
- please schedule fasting blood tests next week in [facility]  - Dr. Christine office will call you with the results  -  life-style modifications: weight loss, daily exercise (30 mins of HIIT or cardio), low carb/low fat diet  - vitamin E supplements (no more than 800 mg per day) may help reduce liver fat  - coffee consumption (low caloric): 2-3 cups per day may reduce liver fat      Nonalcoholic Fatty Liver Disease (NAFLD)  Nonalcoholic fatty liver disease (NAFLD) is a common disease of the liver. It occurs when you have too much fat in the liver. If NAFLD is severe, it can cause liver damage that seems like the damage caused by drinking too much alcohol. But NAFLD is not caused by drinking alcohol. This sheet tells you more about NAFLD and how it can be managed.    How the liver works   The liver is an organ in the upper right side of the belly (abdomen). It has many important jobs. These include:  · Breaking down (metabolizing) proteins, carbohydrates, and fats  · Making a substance called bile that helps break down fats  · Storing and releasing sugar (glucose) into the blood to give the body energy  · Removing toxins from the blood  · Helping with blood clotting  Understanding NAFLD  A healthy liver may contain some fat. But if too much fat builds up in the liver, this causes NAFLD. NAFLD can be mild, causing fatty liver. Or it can be more severe and show inflammation, as well as the fat. This can cause non-alcoholic steatohepatitis (JORDAN).  · Fatty liver. With fatty liver, the liver simply has more fat than normal. This extra fat usually does not harm the liver.  · JORDAN. With JORDAN, the fatty liver becomes inflamed over time. JORDAN is serious because it can lead to scarring of the liver (fibrosis). Over time, the scarring may lead to cirrhosis of the liver. This can eventually cause liver failure or liver cancer.  Causes and risk factors of NAFLD  Doctors don't know what causes NAFLD. But certain things make  the problem more likely to happen. These include:  · Obesity  · Prediabetes or diabetes  · High levels of fat found in the blood (cholesterol and triglycerides)  · Being exposed to certain medicines   Symptoms of NAFLD  Most people with NAFLD have no symptoms. If symptoms do occur, they can include:  · Tiredness  · Weakness  · Weight loss  · Loss of appetite  · Nausea and vomiting  · Belly pain and cramping  · Yellowing of the skin and eyes (jaundice), as well as dark urine, or light-colored stools  · Swelling in the belly or legs  Diagnosing NAFLD  Your healthcare provider may think you have NAFLD if routine blood tests show high levels of liver enzymes. This may mean you have a liver problem. You may need one or more imaging tests, such as an ultrasound, CT, or MRI. You may need more blood tests to look for other causes of liver disease. You may also need a liver biopsy. During this test, a hollow needle is used to remove a tiny tissue sample from your liver. This tissue is then checked in a lab. This test can find signs of damage to liver tissue. It can also help figure out the cause of the damage and tell the difference between fatty liver and JORDAN.  Treating NAFLD  Treatment for NAFLD varies for each person. The best early treatment is to treat any underlying conditions causing metabolic syndrome. This is the name for a group of conditions that includes:  · High blood pressure  · High levels of cholesterol and triglycerides  · Being overweight or obese  · Diabetes  Your healthcare provider will monitor your health and treat any symptoms or underlying health problems you have. Your provider will also work with you to control your risk factors. This will make liver damage less likely. In fact, treating those underlying conditions can often improve liver disease. You may need to take certain medicines, but no medicine will cure NAFLD. This is why treating the underlying conditions is most important. Your plan may  include:  · Losing extra weight  · Getting regular exercise  · Controlling diabetes and high cholesterol or triglyceride levels  · Taking medicines and vitamins as prescribed by your provider  · Quitting smoking  · Not drinking alcohol  · Eating a healthy and balanced diet  Living with NAFLD  If NAFLD is caught early, it can be managed with treatment. Your healthcare provider will discuss further treatment choices with you as needed.  Be sure to ask your provider about recommended vaccines. These include vaccines for viruses that can cause liver disease.  Date Last Reviewed: 12/1/2016 © 2000-2017 [employer]. [address] [place] [address], [place] [address]. All rights reserved. This information is not intended as a substitute for professional medical care. Always follow your healthcare professional's instructions.

## 2018-07-26 NOTE — PROCEDURES
Procedures   Vibration-controlled Transient Elastography Procedure     Name: Jack Sanders  Date of Procedure : 2018   :: Peter Christine MD  Diagnosis: NAFLD    Probe: XL    Universal Protocol: Patient's identity, procedure and site were verified, confirmatory pause was performed.  Discussed procedure including risks and potential complications.  Questions answered.  Patient verbalizes understanding and wishes to proceed with VCTE.     Procedure: After providing explanations of the procedure, patient was placed in the supine position with right arm in maximum abduction to allow optimal exposure of right lateral abdomen.  Patient was briefly assessed, Testing was performed in the mid-axillary location, 50Hz Shear Wave pulses were applied and the resulting Shear Wave and Propagation Speed detected with a 3.5 MHz ultrasonic signal, using the FibroScan probe, Skin to liver capsule distance and liver parenchyma were accessed during the entire examination with the FibroScan probe, Patient was instructed to breathe normally and to abstain from sudden movements during the procedure, allowing for random measurements of liver stiffness. At least 10 Shear Waves were produced, Individual measurements of each Shear Wave were calculated.  Patient tolerated the procedure well with no complications.  Meets discharge criteria as was dismissed.  Rates pain 0 out of 10.  Patient will follow up with ordering provider to review results.      Findings  Median liver stiffness score: 7.8 KPa  CAP readin dB/m      Interpretation  Fibrosis interpretation is based on medial liver stiffness - Kilopascal (kPa).     Fibrosis stage: F2     Steatosis interpretation is based on controlled attenuation parameter - (dB/m).    Steatosis grade: S3       Peter Christine MD  Staff Physician  Transplant Hepatology  [facility]

## 2018-07-26 NOTE — PROGRESS NOTES
Subjective:       Patient ID: Jack Sanders is a 32 y.o. male Body mass index is 34.9 kg/m².    Chief Complaint: Follow-up ( s/p EGD)    This patient is established with Dr. Encinas & myself.    Abdominal Pain   This is a chronic problem. The current episode started more than 1 year ago (started 4-5 years ago). The problem occurs rarely (only if he misses his medications and eats a trigger food). Duration: a few minutes. The problem has been rapidly improving (controlled on medications). The pain is located in the LUQ. The pain is at a severity of 0/10 (currently). The quality of the pain is burning. Associated symptoms include belching, flatus and weight loss (lost 25 lbs over the past month with dieting and exercise). Pertinent negatives include no anorexia, constipation, diarrhea, dysuria, fever, frequency, hematochezia, melena, nausea or vomiting. The pain is aggravated by NSAIDs (over eating, spicy foods, caffeine, hamburgers, aleve prn takes occasional). The pain is relieved by bowel movements, belching and passing flatus. He has tried H2 blockers and proton pump inhibitors (prilosec 40 mg every am; zantac 300 mg nightly) for the symptoms. The treatment provided significant relief. Prior diagnostic workup includes upper endoscopy and ultrasound. His past medical history is significant for GERD (heartburn rarely). There is no history of Crohn's disease, gallstones, pancreatitis or ulcerative colitis.     Review of Systems   Constitutional: Positive for weight loss (lost 25 lbs over the past month with dieting and exercise). Negative for appetite change, chills, fatigue, fever and unexpected weight change.   HENT: Negative for sore throat and trouble swallowing.    Respiratory: Negative for cough, choking and shortness of breath.    Cardiovascular: Negative for chest pain.   Gastrointestinal: Positive for abdominal pain and flatus. Negative for anal bleeding, anorexia, blood in stool, constipation, diarrhea,  hematochezia, melena, nausea, rectal pain and vomiting.   Genitourinary: Negative for difficulty urinating, dysuria, flank pain and frequency.   Neurological: Negative for weakness.       Past Medical History:   Diagnosis Date    Fatty liver     GERD (gastroesophageal reflux disease)     Gout     Hepatosplenomegaly      Past Surgical History:   Procedure Laterality Date    ESOPHAGOGASTRODUODENOSCOPY N/A 6/14/2018    Procedure: EGD (ESOPHAGOGASTRODUODENOSCOPY);  Surgeon: Brian Encinas Jr., MD;  Location: [facility];  Service: Endoscopy;  Laterality: N/A;    UPPER GASTROINTESTINAL ENDOSCOPY  06/14/2018    Dr. Encinas     Family History   Problem Relation Age of Onset    Colon cancer Neg Hx     Colon polyps Neg Hx     Crohn's disease Neg Hx     Esophageal cancer Neg Hx     Rectal cancer Neg Hx     Ulcerative colitis Neg Hx     Stomach cancer Neg Hx      Wt Readings from Last 10 Encounters:   07/26/18 104.1 kg (229 lb 8 oz)   06/14/18 115.7 kg (255 lb)   06/11/18 115.3 kg (254 lb 1.6 oz)     Admission on 06/14/2018, Discharged on 06/14/2018   Component Date Value Ref Range Status    H. pylori Index Value 06/14/2018 Negative  Negative Final   Lab Visit on 06/11/2018   Component Date Value Ref Range Status    WBC 06/11/2018 6.34  3.90 - 12.70 K/uL Final    RBC 06/11/2018 4.37* 4.60 - 6.20 M/uL Final    Hemoglobin 06/11/2018 14.5  14.0 - 18.0 g/dL Final    Hematocrit 06/11/2018 42.0  40.0 - 54.0 % Final    MCV 06/11/2018 96  82 - 98 fL Final    MCH 06/11/2018 33.2* 27.0 - 31.0 pg Final    MCHC 06/11/2018 34.5  32.0 - 36.0 g/dL Final    RDW 06/11/2018 11.7  11.5 - 14.5 % Final    Platelets 06/11/2018 202  150 - 350 K/uL Final    MPV 06/11/2018 11.3  9.2 - 12.9 fL Final    Immature Granulocytes 06/11/2018 0.2  0.0 - 0.5 % Final    Gran # (ANC) 06/11/2018 3.7  1.8 - 7.7 K/uL Final    Immature Grans (Abs) 06/11/2018 0.01  0.00 - 0.04 K/uL Final    Comment: Mild elevation in immature granulocytes is  non specific and   can be seen in a variety of conditions including stress response,   acute inflammation, trauma and pregnancy. Correlation with other   laboratory and clinical findings is essential.      Lymph # 06/11/2018 2.0  1.0 - 4.8 K/uL Final    Mono # 06/11/2018 0.5  0.3 - 1.0 K/uL Final    Eos # 06/11/2018 0.0  0.0 - 0.5 K/uL Final    Baso # 06/11/2018 0.04  0.00 - 0.20 K/uL Final    nRBC 06/11/2018 0  0 /100 WBC Final    Gran% 06/11/2018 58.7  38.0 - 73.0 % Final    Lymph% 06/11/2018 32.0  18.0 - 48.0 % Final    Mono% 06/11/2018 8.2  4.0 - 15.0 % Final    Eosinophil% 06/11/2018 0.3  0.0 - 8.0 % Final    Basophil% 06/11/2018 0.6  0.0 - 1.9 % Final    Differential Method 06/11/2018 Automated   Final    Sodium 06/11/2018 144  136 - 145 mmol/L Final    Potassium 06/11/2018 4.5  3.5 - 5.1 mmol/L Final    Chloride 06/11/2018 109  95 - 110 mmol/L Final    CO2 06/11/2018 28  23 - 29 mmol/L Final    Glucose 06/11/2018 95  70 - 110 mg/dL Final    BUN, Bld 06/11/2018 17  6 - 20 mg/dL Final    Creatinine 06/11/2018 1.5* 0.5 - 1.4 mg/dL Final    Calcium 06/11/2018 9.3  8.7 - 10.5 mg/dL Final    Total Protein 06/11/2018 6.8  6.0 - 8.4 g/dL Final    Albumin 06/11/2018 4.1  3.5 - 5.2 g/dL Final    Total Bilirubin 06/11/2018 0.5  0.1 - 1.0 mg/dL Final    Comment: For infants and newborns, interpretation of results should be based  on gestational age, weight and in agreement with clinical  observations.  Premature Infant recommended reference ranges:  Up to 24 hours.............<8.0 mg/dL  Up to 48 hours............<12.0 mg/dL  3-5 days..................<15.0 mg/dL  6-29 days.................<15.0 mg/dL      Alkaline Phosphatase 06/11/2018 61  55 - 135 U/L Final    AST 06/11/2018 37  10 - 40 U/L Final    ALT 06/11/2018 77* 10 - 44 U/L Final    Anion Gap 06/11/2018 7* 8 - 16 mmol/L Final    eGFR if African American 06/11/2018 >60.0  >60 mL/min/1.73 m^2 Final    eGFR if non African American  "06/11/2018 59.9* >60 mL/min/1.73 m^2 Final    Comment: Calculation used to obtain the estimated glomerular filtration  rate (eGFR) is the CKD-EPI equation.       Lipase 06/11/2018 25  4 - 60 U/L Final    Amylase 06/11/2018 71  20 - 110 U/L Final     Reviewed prior medical records including radiology report of 6/14/2018 abdominal ultrasound & endoscopy history (see surgical history).  6/14/18 EGD was reviewed and procedure report states:   " Findings:       The oropharynx was normal.       The proximal esophagus and mid esophagus were normal.       LA Grade A (one or more mucosal breaks less than 5 mm, not extending        between tops of 2 mucosal folds) esophagitis was found at the        gastroesophageal junction.       The Z-line was regular and was found 37-38 cm from the incisors.       A patulous lower esophageal sphincter was found.       Localized mild inflammation characterized by erythema and linear        erosions was found on the greater curvature of the gastric body.        Biopsies were taken with a cold forceps for histology.       The stomach was otherwise normal. Biopsies were taken from the        antrum with a cold forceps for Helicobacter pylori testing using        CLOtest. Biopsies were taken with a cold forceps for histology.       The pylorus was normal.       Patchy mildly erythematous mucosa was found in the duodenal bulb.       The examined duodenum was otherwise normal. Biopsies for histology        were taken with a cold forceps for evaluation of celiac disease.  Impression:          - Normal oropharynx.                       - Normal proximal esophagus and mid esophagus.                       - LA Grade A reflux esophagitis.                       - Z-line regular, 38 cm from the incisors.                       - Patulous lower esophageal sphincter.                       - Gastritis. Biopsied.                       - Normal stomach otherwise. Biopsied.                       - Normal " "pylorus.                       - Erythematous duodenopathy.                       - Normal examined duodenum. Biopsied.  Recommendation:      - Discharge patient to home.                       - Await pathology and CLOtest results.                       - Follow an antireflux regimen.                       - Continue present medications.                       - Use Prilosec (omeprazole) 40 mg PO daily.                       - Add Zantac (ranitidine) 300 mg PO daily for 2-3                        months.                       - Return to GI clinic in 6-8 weeks. ".  Biopsy results:   "Supplemental Diagnosis  Immunostain for Helicobacter pylori organisms, performed with appropriate controls, is negative.  (Electronically Signed: 2018-06-20 10:37:54 )  Diagnosed by: Tara Denney M.D.  FINAL PATHOLOGIC DIAGNOSIS  1. STOMACH, BIOPSY:  Gastric body and antral mucosa with focal erosin, mild chronic gastritis and reactive changes  No evidence of intestinal metaplasia, dysplasia or malignancy  No evidence of Helicobacter pylori organisms on H&E stain  Immunostain for Helicobacter pylori organisms has been requested and will be reported as a supplemental report  2. RANDOM ANTRUM, BIOPSY:  Gastric body and antral mucosa with focal intestinal metaplasia, mild chronic gastritis and reactive changes  No evidence of dysplasia or malignancy  No evidence of Helicobacter pylori organisms on H&E stain  Immunostain for Helicobacter pylori organisms has been requested and will be reported as a supplemental report  3. RANDOM DUODENUM, BIOPSY:  Small bowel mucosa with no significant pathologic abnormality  Villous architecture is maintained"  Objective:      Physical Exam   Constitutional: He is oriented to person, place, and time. He appears well-developed and well-nourished. No distress.   Eyes: Conjunctivae are normal. No scleral icterus.   Cardiovascular: Normal rate.    Pulmonary/Chest: Effort normal. No respiratory distress. "   Abdominal: Soft. Normal appearance and bowel sounds are normal. He exhibits no distension, no abdominal bruit and no mass. There is no hepatosplenomegaly. There is no tenderness. There is no rigidity, no rebound, no guarding, no tenderness at McBurney's point and negative Tian's sign. No hernia.   Neurological: He is alert and oriented to person, place, and time.   Skin: Skin is warm and dry. No rash noted. He is not diaphoretic. No erythema. No pallor.   Non-jaundiced   Psychiatric: He has a normal mood and affect. His behavior is normal. Judgment and thought content normal.   Nursing note and vitals reviewed.      Assessment:       1. History of esophagogastroduodenoscopy (EGD)    2. Gastroesophageal reflux disease with esophagitis    3. Other chronic gastritis without hemorrhage    4. Hepatosplenomegaly    5. Elevated ALT measurement        Plan:       History of esophagogastroduodenoscopy (EGD), Left upper quadrant pain, Gastroesophageal reflux disease with esophagitis, & Other chronic gastritis without hemorrhage  -     CONTINUE omeprazole (PRILOSEC) 40 MG capsule; Take 1 capsule (40 mg total) by mouth before breakfast; take in the morning 30-60 minutes before breakfast, discussed about possible long term use of medication (prefer to use lowest effective dose or discontinuing if possible), pt verbalized understanding and agreed with management plan  - CONTINUE ZANTAC 300 MG NIGHTLY AS DIRECTED FOR 2 MONTHS THEN DISCONTINUE (COMPLETE CURRENT BOTTLE OF MEDICATION AND DO NOT REFILL)  -discussed about the different types of medications used to treat reflux and how to use them, antacids can be used PRN for breakthrough heartburn symptoms by reducing stomach acid that is already produced, H2 blockers (zantac) work by limiting the amount acid production, & PPI's work to block acid production and are taken daily, patient verbalized understanding.  -CONTINUE lifestyle modifications to help control/reduce  reflux/abdominal pain including: avoid large meals, avoid eating within 2-3 hours of bedtime (avoid late night eating & lying down soon after eating), elevate head of bed if nocturnal symptoms are present, smoking cessation (if current smoker), & weight loss (if overweight).   - avoid known foods which trigger reflux symptoms & to minimize/avoid high-fat foods, chocolate, caffeine, citrus, alcohol, & tomato products.  - avoid/limit use of NSAID's, since they can cause GI upset, bleeding, and/or ulcers. If needed, take with food.     Hepatosplenomegaly & Elevated ALT measurement  - continue with appointment as scheduled with hepatology for later today  - CONTINUE low fat, low cholesterol diet, maintain good control of blood sugars and cholesterol levels, exercise, weight loss (if overweight), minimize/avoid alcohol and tylenol products    Follow-up in about 3 months (around 10/26/2018), or if symptoms worsen or fail to improve.      If no improvement in symptoms or symptoms worsen, call/follow-up at clinic or go to ER.

## 2018-07-26 NOTE — LETTER
July 26, 2018      Rhonda Arellano, FNP  [address] [facility] [address] [place] [address]           [facility] - Hepatology  [address]  [facility] [address]  Phone: [phone]  Fax: [phone]          Patient: Jack Sanders   MR Number: [id number]   YOB: 1985   Date of Visit: 7/26/2018       Dear Rhonda Arellano:    Thank you for referring Jack Sanders to me for evaluation. Attached you will find relevant portions of my assessment and plan of care.    If you have questions, please do not hesitate to call me. I look forward to following Jack Sanders along with you.    Sincerely,    Peter Christine MD    Enclosure  CC:  No Recipients    If you would like to receive this communication electronically, please contact [email or web address] or [phone] to request more information on [employer] Link access.    For providers and/or their staff who would like to refer a patient to [facility], please contact us through our one-stop-shop provider referral line, [facility] , at [phone].    If you feel you have received this communication in error or would no longer like to receive these types of communications, please e-mail [email or web address]

## 2018-07-26 NOTE — PROGRESS NOTES
Hepatology Consult Note    Referring provider: Rhonda Arellano    Chief complaint:   Chief Complaint   Patient presents with    Elevated Hepatic Enzymes       HPI:  Jack Sanders is a 32 y.o. male that presents [facility] for consultation of had concerns including Elevated Hepatic Enzymes..      The patient's risk factors for NAFLD include:    · Obesity                                         Yes (BMI 35)  · Dyslipidemia                                 Unknown, not checked  · Insulin resistance/Diabetes          no  · Family history of diabetes           yes    As regards to liver disease,  - The patient reports no symptoms of hepatitis including malaise or flu-like symptoms to suggest a flare.  - The patient reports no new manifestations of portal hypertension including ascites, edema, GI bleeding, or hepatic encephalopathy to suggest liver decompensation.  - The patient reports no fevers/chills or pruritis to suggest biliary disease.    Lab Results   Component Value Date    ALT 77 (H) 06/11/2018    AST 37 06/11/2018    ALKPHOS 61 06/11/2018    BILITOT 0.5 06/11/2018     Alcohol: no, none in last 12 years  IVDU: no  Tobacco: no    Patient Active Problem List   Diagnosis    Abdominal pain    Elevated liver enzymes    Fatty liver disease, nonalcoholic       Past Medical History:   Diagnosis Date    Fatty liver     GERD (gastroesophageal reflux disease)     Gout     Hepatosplenomegaly        Past Surgical History:   Procedure Laterality Date    ESOPHAGOGASTRODUODENOSCOPY N/A 6/14/2018    Procedure: EGD (ESOPHAGOGASTRODUODENOSCOPY);  Surgeon: Brian Encinas Jr., MD;  Location: [facility];  Service: Endoscopy;  Laterality: N/A;    UPPER GASTROINTESTINAL ENDOSCOPY  06/14/2018    Dr. Encinas       Family History   Problem Relation Age of Onset    Colon cancer Neg Hx     Colon polyps Neg Hx     Crohn's disease Neg Hx     Esophageal cancer Neg Hx     Rectal cancer Neg Hx      "Ulcerative colitis Neg Hx     Stomach cancer Neg Hx        Social History     Social History    Marital status:      Spouse name: N/A    Number of children: N/A    Years of education: N/A     Social History Main Topics    Smoking status: Never Smoker    Smokeless tobacco: Never Used    Alcohol use No    Drug use: No    Sexual activity: Yes     Other Topics Concern    None     Social History Narrative    None       Current Outpatient Prescriptions   Medication Sig Dispense Refill    naproxen sodium (ANAPROX) 220 MG tablet Take 220 mg by mouth 2 (two) times daily as needed.      omeprazole (PRILOSEC) 40 MG capsule Take 1 capsule (40 mg total) by mouth before breakfast. 90 capsule 3    ranitidine (ZANTAC) 300 MG tablet Take 1 tablet (300 mg total) by mouth every evening. , about 30-60 minutes before bedtime. 90 tablet 3     No current facility-administered medications for this visit.        Review of patient's allergies indicates:  No Known Allergies         Vitals:    07/26/18 1114   BP: 127/65   Pulse: (!) 50   Resp: 18   Temp: 96.1 °F (35.6 °C)   TempSrc: Oral   SpO2: (!) 94%   Weight: 104 kg (229 lb 4.5 oz)   Height: 5' 8" (1.727 m)       Physical Exam    LABS: I personally reviewed pertinent laboratory findings.    Lab Results   Component Value Date    ALT 77 (H) 06/11/2018    AST 37 06/11/2018    ALKPHOS 61 06/11/2018    BILITOT 0.5 06/11/2018       Lab Results   Component Value Date    WBC 6.34 06/11/2018    HGB 14.5 06/11/2018    HCT 42.0 06/11/2018    MCV 96 06/11/2018     06/11/2018       Lab Results   Component Value Date     06/11/2018    K 4.5 06/11/2018     06/11/2018    CO2 28 06/11/2018    BUN 17 06/11/2018    CREATININE 1.5 (H) 06/11/2018    CALCIUM 9.3 06/11/2018    ANIONGAP 7 (L) 06/11/2018    ESTGFRAFRICA >60.0 06/11/2018    EGFRNONAA 59.9 (A) 06/11/2018       No results found for: INR, PROTIME    No results found for: SMOOTHMUSCAB, MITOAB  No results found " for: IRON, TIBC, FERRITIN, SATURATEDIRO  No results found for: HAV, HEPAIGM, HEPBIGM, HEPBCAB, HBEAG, HEPCAB  No results found for: TSH  No results found for: ASA    No results found for: ABORH        No results found for: LABA1C, HGBA1C  No results found for: CHOL  No results found for: HDL  No results found for: LDLCALC  No results found for: TRIG  No results found for: CHOLHDL        Imaging:       I personally reviewed recent cardiology studies available on the chart and from outside medical records.    I personally reviewed recent imaging studies available on the chart and from outside medical records.        Assessment:  32 y.o. male presenting with     1. Fatty liver disease, nonalcoholic    2. Elevated liver enzymes        VCTE today : F2-3 (7.8 kPa) and grade 3 steatosis ( dB/m)  Suspect overestimation in liver stiffness measurement due to elevated liver enzymes - JORDAN.  Likely JORDAN.     Recommendation(s):  - will order serologies/autoimmune markers/iron/ceruloplasmin to r/o other possible causes of chronic liver disease for the sake of thoroughness in work-up  - life-style modifications: weight loss, daily exercise (30 mins of HIIT or cardio), low carb/low fat diet  - VCTE (performed)  - control of diabetes or insulin resistance   - control of high cholesterol, if needed with statin drugs or other cholesterol-lowering agents  - vitamin E supplements (no more than 800 mg per day) may help reduce liver fat  - coffee consumption (low caloric): 2-3 cups per day may reduce liver fat  - RTC 6 months    A total of 45 minutes were spent face-to-face with the patient during this encounter and over half of that time was spent on counseling and coordination of care.  We discussed in depth the nature of the patient's liver disease, the management plan in details. I also educated the patient about lifestyle modifications which may improve hepatic steatosis, overweight/obesity, insulin resistance and high blood  pressure issues. I have provided the patient with an opportunity to ask questions and have all questions answered to his satisfaction.     I have sent communication to the referring physician and/or primary care provider.      Peter Christine MD  Staff Physician  Hepatology and Liver Transplant  [facility]  [facility]

## 2018-07-27 LAB
HBV SURFACE AB SER-ACNC: NEGATIVE M[IU]/ML
HBV SURFACE AG SERPL QL IA: NEGATIVE
HCV AB SERPL QL IA: NEGATIVE
MITOCHONDRIA AB TITR SER IF: NORMAL {TITER}
SMOOTH MUSCLE AB TITR SER IF: NORMAL {TITER}